# Patient Record
Sex: FEMALE | URBAN - METROPOLITAN AREA
[De-identification: names, ages, dates, MRNs, and addresses within clinical notes are randomized per-mention and may not be internally consistent; named-entity substitution may affect disease eponyms.]

---

## 2019-03-28 ENCOUNTER — NURSE ONLY (OUTPATIENT)
Dept: ALLERGY | Facility: CLINIC | Age: 49
End: 2019-03-28
Payer: COMMERCIAL

## 2019-03-28 ENCOUNTER — OFFICE VISIT (OUTPATIENT)
Dept: ALLERGY | Facility: CLINIC | Age: 49
End: 2019-03-28
Payer: COMMERCIAL

## 2019-03-28 VITALS
OXYGEN SATURATION: 98 % | BODY MASS INDEX: 28.52 KG/M2 | HEART RATE: 73 BPM | DIASTOLIC BLOOD PRESSURE: 94 MMHG | HEIGHT: 62 IN | TEMPERATURE: 99 F | WEIGHT: 155 LBS | SYSTOLIC BLOOD PRESSURE: 155 MMHG

## 2019-03-28 DIAGNOSIS — J30.89 ALLERGIC RHINITIS DUE TO OTHER ALLERGIC TRIGGER, UNSPECIFIED SEASONALITY: ICD-10-CM

## 2019-03-28 DIAGNOSIS — R49.9 HOARSENESS OR CHANGING VOICE: ICD-10-CM

## 2019-03-28 DIAGNOSIS — J30.89 ENVIRONMENTAL AND SEASONAL ALLERGIES: ICD-10-CM

## 2019-03-28 DIAGNOSIS — J30.1 SEASONAL ALLERGIC RHINITIS DUE TO POLLEN: Primary | ICD-10-CM

## 2019-03-28 PROCEDURE — 99212 OFFICE O/P EST SF 10 MIN: CPT | Performed by: ALLERGY & IMMUNOLOGY

## 2019-03-28 PROCEDURE — 94010 BREATHING CAPACITY TEST: CPT | Performed by: ALLERGY & IMMUNOLOGY

## 2019-03-28 PROCEDURE — 99204 OFFICE O/P NEW MOD 45 MIN: CPT | Performed by: ALLERGY & IMMUNOLOGY

## 2019-03-28 PROCEDURE — 95004 PERQ TESTS W/ALRGNC XTRCS: CPT | Performed by: ALLERGY & IMMUNOLOGY

## 2019-03-28 RX ORDER — MONTELUKAST SODIUM 10 MG/1
10 TABLET ORAL NIGHTLY
Qty: 30 TABLET | Refills: 0 | Status: SHIPPED | OUTPATIENT
Start: 2019-03-28 | End: 2019-04-19

## 2019-03-28 RX ORDER — LEVOCETIRIZINE DIHYDROCHLORIDE 5 MG/1
5 TABLET, FILM COATED ORAL NIGHTLY
Qty: 30 TABLET | Refills: 0 | Status: SHIPPED | OUTPATIENT
Start: 2019-03-28 | End: 2019-04-19

## 2019-03-28 NOTE — PATIENT INSTRUCTIONS
1. [place]  Handouts on allergies and avoidance measures provided and viewed including the potential treatment option of immunotherapy if individual allergens are detected  Patient defers intranasal steroid sprays at this time  Start trial of Singulair, montelu

## 2019-03-28 NOTE — PROGRESS NOTES
Bella Cristina is a 57year old female. HPI:   Patient presents with: Allergies: Pt reports she has had environmental allergies. Pt has a feeling that dust and pollens. She gets a raspy voice. Pt has two cats at home.  June & july are her bad months    Patie heartbeat/palpitations, chest pain, edema  Constitutional:  Negative night sweats,weight loss, irritability and lethargy  Endocrine:  Negative for cold intolerance, polydipsia and polyphagia  ENMT:  Negative for ear drainage, hearing loss.  See hpi   Eyes: voice including a raspy or hoarse voice. She denies significant runny nose sneezing or nasal congestion. Patient has tried over-the-counter antihistamines including Allegra Zyrtec Xyzal and Claritin. No prior singular.   Patient defers intranasal steroid instruction and sample was provided to the patient by myself. Teaching included  a review of potential adverse side effects as well as potential efficacy.   Patient's questions were answered in regards to medication administration and dosing and potential

## 2019-04-20 RX ORDER — MONTELUKAST SODIUM 10 MG/1
TABLET ORAL
Qty: 30 TABLET | Refills: 0 | Status: SHIPPED | OUTPATIENT
Start: 2019-04-20

## 2019-04-20 RX ORDER — LEVOCETIRIZINE DIHYDROCHLORIDE 5 MG/1
TABLET, FILM COATED ORAL
Qty: 30 TABLET | Refills: 0 | Status: SHIPPED | OUTPATIENT
Start: 2019-04-20 | End: 2020-04-21

## 2019-04-20 NOTE — TELEPHONE ENCOUNTER
Pt last seen in Allergy 3/28/2019 for  . . .    Seasonal allergic rhinitis due to pollen  (primary encounter diagnosis)  Allergic rhinitis due to other allergic trigger, unspecified seasonality  Hoarseness or changing voice    Refill request received for .

## 2019-05-06 ENCOUNTER — TELEPHONE (OUTPATIENT)
Dept: ALLERGY | Facility: CLINIC | Age: 49
End: 2019-05-06

## 2020-04-21 NOTE — TELEPHONE ENCOUNTER
Received refill request for Xyzal 5mg- 1 tablet by mouth daily. LOV 3-28-19 for allergies. Refill pended and forwarded to Dr. Romina Lopez for further directions.

## 2020-04-22 RX ORDER — LEVOCETIRIZINE DIHYDROCHLORIDE 5 MG/1
TABLET, FILM COATED ORAL
Qty: 30 TABLET | Refills: 0 | Status: SHIPPED | OUTPATIENT
Start: 2020-04-22

## 2020-04-22 NOTE — TELEPHONE ENCOUNTER
Xyzal refilled x1 month. Patient overdue for one-year follow-up. Patient last seen in March 2019.   Please schedule follow-up appointment within the next 1 week

## 2020-04-22 NOTE — TELEPHONE ENCOUNTER
RN left voicemail to notify patient of RX refill and need for follow up. RN provided info on virtual follow up and provided call back number.